# Patient Record
Sex: FEMALE | Race: WHITE | NOT HISPANIC OR LATINO
[De-identification: names, ages, dates, MRNs, and addresses within clinical notes are randomized per-mention and may not be internally consistent; named-entity substitution may affect disease eponyms.]

---

## 2017-01-04 ENCOUNTER — APPOINTMENT (OUTPATIENT)
Dept: INTERNAL MEDICINE | Facility: CLINIC | Age: 57
End: 2017-01-04

## 2017-11-13 ENCOUNTER — LABORATORY RESULT (OUTPATIENT)
Age: 57
End: 2017-11-13

## 2017-11-13 ENCOUNTER — TRANSCRIPTION ENCOUNTER (OUTPATIENT)
Age: 57
End: 2017-11-13

## 2017-11-14 ENCOUNTER — APPOINTMENT (OUTPATIENT)
Dept: INTERNAL MEDICINE | Facility: CLINIC | Age: 57
End: 2017-11-14
Payer: COMMERCIAL

## 2017-11-14 PROCEDURE — 36415 COLL VENOUS BLD VENIPUNCTURE: CPT

## 2017-11-15 ENCOUNTER — APPOINTMENT (OUTPATIENT)
Dept: INTERNAL MEDICINE | Facility: CLINIC | Age: 57
End: 2017-11-15
Payer: COMMERCIAL

## 2017-11-15 VITALS
BODY MASS INDEX: 29.02 KG/M2 | TEMPERATURE: 98.3 F | OXYGEN SATURATION: 98 % | HEIGHT: 64 IN | SYSTOLIC BLOOD PRESSURE: 134 MMHG | WEIGHT: 170 LBS | HEART RATE: 89 BPM | DIASTOLIC BLOOD PRESSURE: 74 MMHG

## 2017-11-15 DIAGNOSIS — Z87.2 PERSONAL HISTORY OF DISEASES OF THE SKIN AND SUBCUTANEOUS TISSUE: ICD-10-CM

## 2017-11-15 DIAGNOSIS — Z87.09 PERSONAL HISTORY OF OTHER DISEASES OF THE RESPIRATORY SYSTEM: ICD-10-CM

## 2017-11-15 DIAGNOSIS — M71.20 SYNOVIAL CYST OF POPLITEAL SPACE [BAKER], UNSPECIFIED KNEE: ICD-10-CM

## 2017-11-15 DIAGNOSIS — Z00.00 ENCOUNTER FOR GENERAL ADULT MEDICAL EXAMINATION W/OUT ABNORMAL FINDINGS: ICD-10-CM

## 2017-11-15 DIAGNOSIS — Z86.19 PERSONAL HISTORY OF OTHER INFECTIOUS AND PARASITIC DISEASES: ICD-10-CM

## 2017-11-15 DIAGNOSIS — R73.09 OTHER ABNORMAL GLUCOSE: ICD-10-CM

## 2017-11-15 DIAGNOSIS — R31.9 HEMATURIA, UNSPECIFIED: ICD-10-CM

## 2017-11-15 DIAGNOSIS — R73.02 IMPAIRED GLUCOSE TOLERANCE (ORAL): ICD-10-CM

## 2017-11-15 DIAGNOSIS — N95.1 MENOPAUSAL AND FEMALE CLIMACTERIC STATES: ICD-10-CM

## 2017-11-15 PROCEDURE — 99396 PREV VISIT EST AGE 40-64: CPT | Mod: 25

## 2017-11-15 PROCEDURE — 90471 IMMUNIZATION ADMIN: CPT

## 2017-11-15 PROCEDURE — 90715 TDAP VACCINE 7 YRS/> IM: CPT

## 2017-11-15 RX ORDER — NITROFURANTOIN MONOHYDRATE/MACROCRYSTALLINE 25; 75 MG/1; MG/1
100 CAPSULE ORAL
Refills: 0 | Status: ACTIVE | COMMUNITY

## 2017-11-15 RX ORDER — UBIDECARENONE/VIT E ACET 100MG-5
50 MCG CAPSULE ORAL
Refills: 0 | Status: ACTIVE | COMMUNITY

## 2017-11-16 ENCOUNTER — APPOINTMENT (OUTPATIENT)
Dept: CT IMAGING | Facility: CLINIC | Age: 57
End: 2017-11-16

## 2017-11-20 ENCOUNTER — TRANSCRIPTION ENCOUNTER (OUTPATIENT)
Age: 57
End: 2017-11-20

## 2017-11-20 RX ORDER — CLONAZEPAM 2 MG/1
2 TABLET ORAL
Refills: 0 | Status: DISCONTINUED | COMMUNITY
End: 2017-11-20

## 2017-11-20 RX ORDER — CLONAZEPAM 0.5 MG/1
0.5 TABLET ORAL DAILY
Refills: 0 | Status: ACTIVE | COMMUNITY
Start: 2017-11-20

## 2017-11-22 ENCOUNTER — APPOINTMENT (OUTPATIENT)
Dept: GASTROENTEROLOGY | Facility: CLINIC | Age: 57
End: 2017-11-22
Payer: COMMERCIAL

## 2017-11-22 DIAGNOSIS — R19.4 CHANGE IN BOWEL HABIT: ICD-10-CM

## 2017-11-22 DIAGNOSIS — R10.9 UNSPECIFIED ABDOMINAL PAIN: ICD-10-CM

## 2017-11-22 DIAGNOSIS — R19.7 DIARRHEA, UNSPECIFIED: ICD-10-CM

## 2017-11-22 PROCEDURE — 99243 OFF/OP CNSLTJ NEW/EST LOW 30: CPT

## 2017-11-22 RX ORDER — SODIUM SULFATE, POTASSIUM SULFATE, MAGNESIUM SULFATE 17.5; 3.13; 1.6 G/ML; G/ML; G/ML
17.5-3.13-1.6 SOLUTION, CONCENTRATE ORAL
Qty: 177 | Refills: 0 | Status: ACTIVE | COMMUNITY
Start: 2017-11-22 | End: 1900-01-01

## 2017-11-22 RX ORDER — HYOSCYAMINE SULFATE 0.12 MG/1
0.12 TABLET SUBLINGUAL 3 TIMES DAILY
Qty: 90 | Refills: 1 | Status: ACTIVE | COMMUNITY
Start: 2017-11-22 | End: 1900-01-01

## 2017-12-18 ENCOUNTER — APPOINTMENT (OUTPATIENT)
Dept: OBGYN | Facility: CLINIC | Age: 57
End: 2017-12-18
Payer: COMMERCIAL

## 2017-12-18 VITALS
HEART RATE: 87 BPM | BODY MASS INDEX: 28.85 KG/M2 | DIASTOLIC BLOOD PRESSURE: 80 MMHG | WEIGHT: 169 LBS | HEIGHT: 64 IN | SYSTOLIC BLOOD PRESSURE: 137 MMHG

## 2017-12-18 DIAGNOSIS — N95.9 UNSPECIFIED MENOPAUSAL AND PERIMENOPAUSAL DISORDER: ICD-10-CM

## 2017-12-18 PROCEDURE — 99386 PREV VISIT NEW AGE 40-64: CPT

## 2017-12-18 PROCEDURE — 99213 OFFICE O/P EST LOW 20 MIN: CPT | Mod: 25

## 2017-12-18 RX ORDER — VENLAFAXINE HYDROCHLORIDE 75 MG/1
75 CAPSULE, EXTENDED RELEASE ORAL
Qty: 60 | Refills: 0 | Status: ACTIVE | COMMUNITY
Start: 2017-07-28

## 2017-12-18 RX ORDER — NAPROXEN 500 MG/1
500 TABLET ORAL
Qty: 60 | Refills: 0 | Status: ACTIVE | COMMUNITY
Start: 2017-07-07

## 2017-12-18 RX ORDER — PREDNISONE 10 MG/1
10 TABLET ORAL
Qty: 30 | Refills: 0 | Status: ACTIVE | COMMUNITY
Start: 2017-06-19

## 2017-12-20 LAB — HPV HIGH+LOW RISK DNA PNL CVX: NOT DETECTED

## 2017-12-22 ENCOUNTER — APPOINTMENT (OUTPATIENT)
Dept: INTERNAL MEDICINE | Facility: CLINIC | Age: 57
End: 2017-12-22
Payer: COMMERCIAL

## 2017-12-22 VITALS
BODY MASS INDEX: 29.02 KG/M2 | OXYGEN SATURATION: 99 % | WEIGHT: 170 LBS | DIASTOLIC BLOOD PRESSURE: 86 MMHG | HEIGHT: 64 IN | HEART RATE: 87 BPM | TEMPERATURE: 96.9 F | SYSTOLIC BLOOD PRESSURE: 110 MMHG

## 2017-12-22 DIAGNOSIS — R19.7 DIARRHEA, UNSPECIFIED: ICD-10-CM

## 2017-12-22 DIAGNOSIS — R23.2 FLUSHING: ICD-10-CM

## 2017-12-22 DIAGNOSIS — K76.0 FATTY (CHANGE OF) LIVER, NOT ELSEWHERE CLASSIFIED: ICD-10-CM

## 2017-12-22 DIAGNOSIS — E66.3 OVERWEIGHT: ICD-10-CM

## 2017-12-22 DIAGNOSIS — E78.00 PURE HYPERCHOLESTEROLEMIA, UNSPECIFIED: ICD-10-CM

## 2017-12-22 PROCEDURE — 99213 OFFICE O/P EST LOW 20 MIN: CPT

## 2017-12-28 LAB — CYTOLOGY CVX/VAG DOC THIN PREP: NORMAL

## 2018-01-18 ENCOUNTER — APPOINTMENT (OUTPATIENT)
Dept: OBGYN | Facility: CLINIC | Age: 58
End: 2018-01-18
Payer: COMMERCIAL

## 2018-01-18 ENCOUNTER — ASOB RESULT (OUTPATIENT)
Age: 58
End: 2018-01-18

## 2018-01-18 VITALS
HEART RATE: 90 BPM | BODY MASS INDEX: 28.88 KG/M2 | HEIGHT: 64 IN | DIASTOLIC BLOOD PRESSURE: 79 MMHG | WEIGHT: 169.13 LBS | SYSTOLIC BLOOD PRESSURE: 118 MMHG

## 2018-01-18 DIAGNOSIS — N92.0 EXCESSIVE AND FREQUENT MENSTRUATION WITH REGULAR CYCLE: ICD-10-CM

## 2018-01-18 DIAGNOSIS — N85.8 OTHER SPECIFIED NONINFLAMMATORY DISORDERS OF UTERUS: ICD-10-CM

## 2018-01-18 PROCEDURE — 99213 OFFICE O/P EST LOW 20 MIN: CPT

## 2018-01-18 PROCEDURE — 76830 TRANSVAGINAL US NON-OB: CPT

## 2018-02-18 DIAGNOSIS — Z01.419 ENCOUNTER FOR GYNECOLOGICAL EXAMINATION (GENERAL) (ROUTINE) W/OUT ABNORMAL FINDINGS: ICD-10-CM

## 2018-02-18 DIAGNOSIS — Z87.440 PERSONAL HISTORY OF URINARY (TRACT) INFECTIONS: ICD-10-CM

## 2018-02-18 DIAGNOSIS — Z23 ENCOUNTER FOR IMMUNIZATION: ICD-10-CM

## 2018-02-18 DIAGNOSIS — Z12.31 ENCOUNTER FOR SCREENING MAMMOGRAM FOR MALIGNANT NEOPLASM OF BREAST: ICD-10-CM

## 2018-02-19 ENCOUNTER — APPOINTMENT (OUTPATIENT)
Dept: INTERNAL MEDICINE | Facility: CLINIC | Age: 58
End: 2018-02-19

## 2018-03-06 ENCOUNTER — APPOINTMENT (OUTPATIENT)
Dept: GASTROENTEROLOGY | Facility: CLINIC | Age: 58
End: 2018-03-06

## 2018-06-27 ENCOUNTER — TRANSCRIPTION ENCOUNTER (OUTPATIENT)
Age: 58
End: 2018-06-27

## 2018-07-02 ENCOUNTER — APPOINTMENT (OUTPATIENT)
Dept: INTERNAL MEDICINE | Facility: CLINIC | Age: 58
End: 2018-07-02
Payer: COMMERCIAL

## 2018-07-02 VITALS
WEIGHT: 169 LBS | SYSTOLIC BLOOD PRESSURE: 120 MMHG | DIASTOLIC BLOOD PRESSURE: 78 MMHG | BODY MASS INDEX: 28.85 KG/M2 | TEMPERATURE: 97 F | HEIGHT: 64 IN

## 2018-07-02 DIAGNOSIS — J06.9 ACUTE UPPER RESPIRATORY INFECTION, UNSPECIFIED: ICD-10-CM

## 2018-07-02 DIAGNOSIS — Z87.09 PERSONAL HISTORY OF OTHER DISEASES OF THE RESPIRATORY SYSTEM: ICD-10-CM

## 2018-07-02 DIAGNOSIS — R50.9 FEVER, UNSPECIFIED: ICD-10-CM

## 2018-07-02 PROCEDURE — 87880 STREP A ASSAY W/OPTIC: CPT | Mod: QW

## 2018-07-02 PROCEDURE — 99214 OFFICE O/P EST MOD 30 MIN: CPT | Mod: 25

## 2018-07-02 PROCEDURE — 36415 COLL VENOUS BLD VENIPUNCTURE: CPT

## 2018-07-02 RX ORDER — AMOXICILLIN AND CLAVULANATE POTASSIUM 875; 125 MG/1; MG/1
875-125 TABLET, COATED ORAL TWICE DAILY
Qty: 20 | Refills: 0 | Status: ACTIVE | COMMUNITY
Start: 2018-07-02 | End: 1900-01-01

## 2018-07-06 LAB
BACTERIA THROAT CULT: NORMAL
BASOPHILS # BLD AUTO: 0.03 K/UL
BASOPHILS NFR BLD AUTO: 0.4 %
EOSINOPHIL # BLD AUTO: 0.16 K/UL
EOSINOPHIL NFR BLD AUTO: 2.1 %
HCT VFR BLD CALC: 38.5 %
HETEROPH AB SER QL: NEGATIVE
HGB BLD-MCNC: 11.8 G/DL
IMM GRANULOCYTES NFR BLD AUTO: 0.4 %
LYMPHOCYTES # BLD AUTO: 1.94 K/UL
LYMPHOCYTES NFR BLD AUTO: 25.8 %
MAN DIFF?: NORMAL
MCHC RBC-ENTMCNC: 26.9 PG
MCHC RBC-ENTMCNC: 30.6 GM/DL
MCV RBC AUTO: 87.9 FL
MONOCYTES # BLD AUTO: 0.95 K/UL
MONOCYTES NFR BLD AUTO: 12.6 %
NEUTROPHILS # BLD AUTO: 4.42 K/UL
NEUTROPHILS NFR BLD AUTO: 58.7 %
PLATELET # BLD AUTO: 384 K/UL
RBC # BLD: 4.38 M/UL
RBC # FLD: 13.5 %
WBC # FLD AUTO: 7.53 K/UL

## 2018-12-14 DIAGNOSIS — H91.90 UNSPECIFIED HEARING LOSS, UNSPECIFIED EAR: ICD-10-CM

## 2018-12-26 ENCOUNTER — TRANSCRIPTION ENCOUNTER (OUTPATIENT)
Age: 58
End: 2018-12-26

## 2018-12-27 ENCOUNTER — EMERGENCY (EMERGENCY)
Facility: HOSPITAL | Age: 58
LOS: 1 days | Discharge: HOME | End: 2018-12-27
Attending: EMERGENCY MEDICINE
Payer: COMMERCIAL

## 2018-12-27 VITALS
DIASTOLIC BLOOD PRESSURE: 85 MMHG | OXYGEN SATURATION: 96 % | SYSTOLIC BLOOD PRESSURE: 131 MMHG | RESPIRATION RATE: 16 BRPM | HEART RATE: 84 BPM | TEMPERATURE: 98 F

## 2018-12-27 VITALS
HEIGHT: 64 IN | RESPIRATION RATE: 18 BRPM | TEMPERATURE: 98 F | HEART RATE: 101 BPM | DIASTOLIC BLOOD PRESSURE: 101 MMHG | WEIGHT: 160.06 LBS | OXYGEN SATURATION: 98 % | SYSTOLIC BLOOD PRESSURE: 152 MMHG

## 2018-12-27 PROCEDURE — 70450 CT HEAD/BRAIN W/O DYE: CPT

## 2018-12-27 PROCEDURE — 21310: CPT

## 2018-12-27 PROCEDURE — 90715 TDAP VACCINE 7 YRS/> IM: CPT

## 2018-12-27 PROCEDURE — 99284 EMERGENCY DEPT VISIT MOD MDM: CPT | Mod: 25

## 2018-12-27 PROCEDURE — 12052 INTMD RPR FACE/MM 2.6-5.0 CM: CPT | Mod: XU

## 2018-12-27 PROCEDURE — 70486 CT MAXILLOFACIAL W/O DYE: CPT

## 2018-12-27 PROCEDURE — 76377 3D RENDER W/INTRP POSTPROCES: CPT

## 2018-12-27 PROCEDURE — 90471 IMMUNIZATION ADMIN: CPT

## 2018-12-27 PROCEDURE — 70450 CT HEAD/BRAIN W/O DYE: CPT | Mod: 26

## 2018-12-27 PROCEDURE — 12052 INTMD RPR FACE/MM 2.6-5.0 CM: CPT | Mod: 54,59

## 2018-12-27 PROCEDURE — 70486 CT MAXILLOFACIAL W/O DYE: CPT | Mod: 26

## 2018-12-27 PROCEDURE — 96374 THER/PROPH/DIAG INJ IV PUSH: CPT | Mod: XU

## 2018-12-27 PROCEDURE — 76377 3D RENDER W/INTRP POSTPROCES: CPT | Mod: 26

## 2018-12-27 RX ORDER — TETANUS TOXOID, REDUCED DIPHTHERIA TOXOID AND ACELLULAR PERTUSSIS VACCINE, ADSORBED 5; 2.5; 8; 8; 2.5 [IU]/.5ML; [IU]/.5ML; UG/.5ML; UG/.5ML; UG/.5ML
0.5 SUSPENSION INTRAMUSCULAR ONCE
Qty: 0 | Refills: 0 | Status: COMPLETED | OUTPATIENT
Start: 2018-12-27 | End: 2018-12-27

## 2018-12-27 RX ORDER — ACETAMINOPHEN 500 MG
1000 TABLET ORAL ONCE
Qty: 0 | Refills: 0 | Status: COMPLETED | OUTPATIENT
Start: 2018-12-27 | End: 2018-12-27

## 2018-12-27 RX ORDER — LIDOCAINE/EPINEPHR/TETRACAINE 4-0.09-0.5
1 GEL WITH PREFILLED APPLICATOR (ML) TOPICAL ONCE
Qty: 0 | Refills: 0 | Status: COMPLETED | OUTPATIENT
Start: 2018-12-27 | End: 2018-12-27

## 2018-12-27 RX ADMIN — Medication 1 APPLICATION(S): at 17:20

## 2018-12-27 RX ADMIN — Medication 400 MILLIGRAM(S): at 17:18

## 2018-12-27 RX ADMIN — TETANUS TOXOID, REDUCED DIPHTHERIA TOXOID AND ACELLULAR PERTUSSIS VACCINE, ADSORBED 0.5 MILLILITER(S): 5; 2.5; 8; 8; 2.5 SUSPENSION INTRAMUSCULAR at 17:18

## 2018-12-27 RX ADMIN — Medication 1 TABLET(S): at 22:09

## 2018-12-27 RX ADMIN — Medication 1000 MILLIGRAM(S): at 22:09

## 2018-12-27 NOTE — ED PROVIDER NOTE - CARE PLAN
Principal Discharge DX:	Closed fracture of nasal bone, initial encounter  Assessment and plan of treatment:	1. follow up with your pmd in 24-48 hours  2. Follow up with ENT Dr. Pimentel [phone] in 7 days, call to make an appointment.  3. take augmentin 875 twice a day, take with food, take probiotic/yogurt daily. keep hydrated, tylenol 975 every 6 hours as needed for pain.   4. rest, refrain from reading/watching television, using telephone or anything straining your eyes for the next 3-4 days. Refrain from blowing your nose or sneezing through your nose, keep hydrated.  5. If you have any fevers, chills, confusion, numbness/tingling, nausea/vomiting, difficulty ambulating, worsening or concerning symptoms, return to the ED.  Secondary Diagnosis:	Concussion without loss of consciousness, initial encounter Principal Discharge DX:	Closed fracture of nasal bone, initial encounter  Assessment and plan of treatment:	1. follow up with your pmd in 24-48 hours  2. Follow up with ENT Dr. Pimentel [phone] in 7 days, call to make an appointment.  3. take augmentin 875 twice a day, take with food, take probiotic/yogurt daily. keep hydrated, tylenol 975 every 6 hours as needed for pain.   4. rest, refrain from reading/watching television, using telephone or anything straining your eyes for the next 3-4 days. Refrain from blowing your nose or sneezing through your nose, keep hydrated.  5. If you have any fevers, chills, confusion, numbness/tingling, nausea/vomiting, difficulty ambulating, worsening or concerning symptoms, return to the ED.  Secondary Diagnosis:	Concussion without loss of consciousness, initial encounter  Secondary Diagnosis:	Contusion of right knee, initial encounter

## 2018-12-27 NOTE — ED ADULT NURSE NOTE - NSIMPLEMENTINTERV_GEN_ALL_ED
Implemented All Universal Safety Interventions:  [place] to call system. Call bell, personal items and telephone within reach. Instruct patient to call for assistance. Room bathroom lighting operational. Non-slip footwear when patient is off stretcher. Physically safe environment: no spills, clutter or unnecessary equipment. Stretcher in lowest position, wheels locked, appropriate side rails in place.

## 2018-12-27 NOTE — ED PROVIDER NOTE - ATTENDING CONTRIBUTION TO CARE
------------ATTENDING NOTE------------   pt w/ family c/o mechanical fall, hit face on ground, no loss of consciousness but felt stunned, c/o swelling/deformity and constant mild ache to nose w/ laceration to L eyebrow and superficial abrasions to forehead / R knee, has normal neuro exam, no CTL spine tenderness and has +FROM w/o pain, no ocular injury or dental injury, wound repaired, reviewed imaging, ENT for fu, tolerating PO at dc, ambulatory w/o pain/discomfort, in depth dw all about ddx, tx, osorio, continued close outpt fu.  - Noel Garcia MD   -----------------------------------------------

## 2018-12-27 NOTE — ED PROVIDER NOTE - NSFOLLOWUPINSTRUCTIONS_ED_ALL_ED_FT
1. follow up with your pmd in 24-48 hours  2. Follow up with ENT Dr. Pimentel [phone] in 7 days, call to make an appointment.  3. take augmentin 875 twice a day, take with food, take probiotic/yogurt daily. keep hydrated, tylenol 975 every 6 hours as needed for pain.   4. rest, refrain from reading/watching television, using telephone or anything straining your eyes for the next 3-4 days. Refrain from blowing your nose or sneezing through your nose, keep hydrated. Keep sutures clean and dry- they will dissolve on their own.   5. If you have any fevers, chills, confusion, numbness/tingling, nausea/vomiting, difficulty ambulating, worsening or concerning symptoms, return to the ED.

## 2018-12-27 NOTE — CONSULT NOTE ADULT - ASSESSMENT
Assessment:  The patient is a 58 year old female with a past medical history significant for anxiety, who presents to the emergency room at [facility], [place] with a chief complaint of  facial pain s/p fall today at 4:00pm. Ddx nasal fracture, no e/o septal hematoma    Plan:  1) no acute surgical intervention  2) saline nasal spray BID x 3 weeks  3) ice packs to nose prn swelling, no to exceed 15 min at a time.

## 2018-12-27 NOTE — CONSULT NOTE ADULT - ENT GEN HX ROS MEA POS PC
nasal congestion/post-nasal discharge/epistaxis/sinus symptoms/nasal discharge/nasal obstruction/dysphagia

## 2018-12-27 NOTE — CONSULT NOTE ADULT - COMMENTS
Vital Signs Last 24 Hrs  T(C): 36.6 (27 Dec 2018 21:17), Max: 36.7 (27 Dec 2018 16:48)  T(F): 97.9 (27 Dec 2018 21:17), Max: 98 (27 Dec 2018 16:48)  HR: 84 (27 Dec 2018 21:17) (84 - 107)  BP: 131/85 (27 Dec 2018 21:17) (131/85 - 152/101)  BP(mean): --  RR: 16 (27 Dec 2018 21:17) (16 - 19)  SpO2: 96% (27 Dec 2018 21:17) (96% - 98%)

## 2018-12-27 NOTE — ED PROCEDURE NOTE - PROCEDURE ADDITIONAL DETAILS
4 cm superficial laceration L eyebrow, no depression/crepitus, no ocular/lacrimal injury, nvi w/ bcr distally around in V1/V2   - anesthesia LET, copious irrigation w/ NS, explored to base in bloodless field   - Vicryl 3-0 X3 subcutaneous sutures   - FG 6-0 X9 simple interrupted sutures with good approximation   - steri strip/adhesive dressing   - in depth dw pt/family about wound care, scar formation    Noel Garcia MD

## 2018-12-27 NOTE — ED ADULT NURSE NOTE - CAS ELECT INFOMATION PROVIDED
DC instructions/f/u with ENT Dr. Pimentel, augmentin at pharmacy, tylenol, rest, decrease screen time

## 2018-12-27 NOTE — ED PROVIDER NOTE - SECONDARY DIAGNOSIS.
Concussion without loss of consciousness, initial encounter Contusion of right knee, initial encounter

## 2018-12-27 NOTE — ED PROVIDER NOTE - PLAN OF CARE
1. follow up with your pmd in 24-48 hours  2. Follow up with ENT Dr. Pimentel [phone] in 7 days, call to make an appointment.  3. take augmentin 875 twice a day, take with food, take probiotic/yogurt daily. keep hydrated, tylenol 975 every 6 hours as needed for pain.   4. rest, refrain from reading/watching television, using telephone or anything straining your eyes for the next 3-4 days. Refrain from blowing your nose or sneezing through your nose, keep hydrated.  5. If you have any fevers, chills, confusion, numbness/tingling, nausea/vomiting, difficulty ambulating, worsening or concerning symptoms, return to the ED.

## 2018-12-27 NOTE — ED PROVIDER NOTE - OBJECTIVE STATEMENT
59 yo female with pmh of anxiety, presenting for right knee and facial pain s/p fall today at 4:00pm. She notes she was walking on a metal bridge, when she tripped over her right foot and landed on her right knee and her face sustaining abrasions to both areas.  She notes she did not lose consciousness, no headache, n/v afterwards, no chest pain, heart palpitations before or after. She recalls the entire event, no AC use. She was able to ambulate afterwards with minor assistance. She has no change in vision, no blurred vision.

## 2018-12-27 NOTE — ED ADULT NURSE NOTE - OBJECTIVE STATEMENT
58 y.o. Female presents to the ED via EMS for trip and fall. Pt reports tripping on L foot and fell on R knee and face on train platform. Denies blood thinners, vision changes. L medial eyebrow lac noted - actively bleeding. Deformity to nose noted. R knee abrasion. As per EMS, pt is ambulatory at scene with assistance. Unknown last tetanus. Conversing appropriately. A&Ox3. Denies LOC. Pt is in no current distress. Comfort and safety provided. Will continue to monitor. Awaiting ED MD consult.

## 2018-12-27 NOTE — ED PROVIDER NOTE - ENMT, MLM
+ left periorbital ecchymosis and tenderness, Airway patent, Nasal mucosa clear. Mouth with normal mucosa. Throat has no vesicles, no oropharyngeal exudates and uvula is midline. + left periorbital ecchymosis and tenderness, Nasal bone tenderness,  Airway patent, Nasal mucosa clear. Mouth with normal mucosa. Throat has no vesicles, no oropharyngeal exudates and uvula is midline.

## 2018-12-27 NOTE — CONSULT NOTE ADULT - ENMT COMMENTS
Flexible Fiberoptic Laryngoscopy: clear nasopharynx to glottis, tvc mobile b/l, no e/o septal hematoma, airway patent

## 2018-12-27 NOTE — CONSULT NOTE ADULT - NOSE
no deviation/dried blood/inflamed mucosa/congestion/edema over nasal bridge which is straight. Nasal/sinus endoscopy: maxillary sinus accessed via inferior meatus with serosanguinous fluid b/l, no masses

## 2018-12-27 NOTE — CONSULT NOTE ADULT - SUBJECTIVE AND OBJECTIVE BOX
HPI: The patient is a 58 year old female with a past medical history significant for anxiety, who presents to the emergency room at [facility] with a chief complaint of  facial pain s/p fall today at 4:00pm. She notes she was walking on a metal bridge, when she tripped over her right foot and landed on her right knee and her face sustaining abrasions to both areas.  She notes she did not lose consciousness, no headache, n/v afterwards, no chest pain, heart palpitations before or after. She recalls the entire event, no AC use. She was able to ambulate afterwards with minor assistance. She has no change in vision, no blurred vision. She has been coughing up occasional blood in saliva, now stopped	    HIV:    HIV Status:  · Offered: Unable to consent due to medical condition	    PAST MEDICAL/SURGICAL/FAMILY/SOCIAL HISTORY:    Tobacco Usage:  · Tobacco Usage	Unknown if ever smoked	    ALLERGIES AND HOME MEDICATIONS:   Allergies:        Allergies:  	Keflex: Drug, Unknown  	Xanax: Drug, Unknown  	"Blue dye": Miscellaneous, Unknown, "Blue dye"    Home Medications:   * Outpatient Medication Status not yet specified

## 2018-12-28 ENCOUNTER — EMERGENCY (EMERGENCY)
Facility: HOSPITAL | Age: 58
LOS: 1 days | Discharge: HOME | End: 2018-12-28
Attending: EMERGENCY MEDICINE
Payer: COMMERCIAL

## 2018-12-28 VITALS
SYSTOLIC BLOOD PRESSURE: 146 MMHG | RESPIRATION RATE: 16 BRPM | HEART RATE: 84 BPM | WEIGHT: 167.99 LBS | DIASTOLIC BLOOD PRESSURE: 78 MMHG | HEIGHT: 64 IN | OXYGEN SATURATION: 97 %

## 2018-12-28 VITALS
DIASTOLIC BLOOD PRESSURE: 81 MMHG | TEMPERATURE: 98 F | HEART RATE: 73 BPM | SYSTOLIC BLOOD PRESSURE: 137 MMHG | OXYGEN SATURATION: 97 % | RESPIRATION RATE: 18 BRPM

## 2018-12-28 PROCEDURE — 99284 EMERGENCY DEPT VISIT MOD MDM: CPT | Mod: 25

## 2018-12-28 PROCEDURE — 96375 TX/PRO/DX INJ NEW DRUG ADDON: CPT

## 2018-12-28 PROCEDURE — 70450 CT HEAD/BRAIN W/O DYE: CPT | Mod: 26

## 2018-12-28 PROCEDURE — 96374 THER/PROPH/DIAG INJ IV PUSH: CPT

## 2018-12-28 PROCEDURE — 70450 CT HEAD/BRAIN W/O DYE: CPT

## 2018-12-28 RX ORDER — METOCLOPRAMIDE HCL 10 MG
10 TABLET ORAL ONCE
Qty: 0 | Refills: 0 | Status: COMPLETED | OUTPATIENT
Start: 2018-12-28 | End: 2018-12-28

## 2018-12-28 RX ORDER — ACETAMINOPHEN 500 MG
1000 TABLET ORAL ONCE
Qty: 0 | Refills: 0 | Status: COMPLETED | OUTPATIENT
Start: 2018-12-28 | End: 2018-12-28

## 2018-12-28 RX ORDER — KETOROLAC TROMETHAMINE 30 MG/ML
15 SYRINGE (ML) INJECTION ONCE
Qty: 0 | Refills: 0 | Status: DISCONTINUED | OUTPATIENT
Start: 2018-12-28 | End: 2018-12-28

## 2018-12-28 RX ORDER — SODIUM CHLORIDE 9 MG/ML
1000 INJECTION INTRAMUSCULAR; INTRAVENOUS; SUBCUTANEOUS ONCE
Qty: 0 | Refills: 0 | Status: COMPLETED | OUTPATIENT
Start: 2018-12-28 | End: 2018-12-28

## 2018-12-28 RX ORDER — ONDANSETRON 8 MG/1
1 TABLET, FILM COATED ORAL
Qty: 20 | Refills: 0 | OUTPATIENT
Start: 2018-12-28

## 2018-12-28 RX ADMIN — SODIUM CHLORIDE 1000 MILLILITER(S): 9 INJECTION INTRAMUSCULAR; INTRAVENOUS; SUBCUTANEOUS at 04:13

## 2018-12-28 RX ADMIN — Medication 1000 MILLIGRAM(S): at 06:10

## 2018-12-28 RX ADMIN — Medication 10 MILLIGRAM(S): at 04:13

## 2018-12-28 RX ADMIN — Medication 15 MILLIGRAM(S): at 07:15

## 2018-12-28 RX ADMIN — Medication 400 MILLIGRAM(S): at 04:13

## 2018-12-28 NOTE — ED ADULT NURSE REASSESSMENT NOTE - NS ED NURSE REASSESS COMMENT FT1
Patient complaining that EMS placed IV is causing pain. 20G left hand IV removed. 20G peripheral IV placed in right ac. No complaints at this time

## 2018-12-28 NOTE — ED PROVIDER NOTE - OBJECTIVE STATEMENT
58yof tripped and fell yesterday evening landing on her face, sustained bilateral nasal bone fractures and concussion. Since discharge from the ED she has been having increasing frontal headache, with persistent nausea and vomiting. Has been unable to keep down liquids or medications.

## 2018-12-28 NOTE — ED PROVIDER NOTE - MEDICAL DECISION MAKING DETAILS
Worsening headache and nausea after closed head trauma, likely post concussive syndrome however also concerned for delayed bleed. Will treat symptoms w/ IV pain meds and antiemetics, repeat CTH.

## 2018-12-28 NOTE — ED ADULT NURSE NOTE - OBJECTIVE STATEMENT
62y/o Female parented to the ED from home via EMS with complaint of headache and vomiting. A&Ox3, ambulatory. Patient was seen in ED earlier today after [place]. Patient was dx with nasal fx and concussion and was d/c home. Patient woke up with severe frontal headache and multiple episodes of vomiting. Patient reports 7 episodes of vomiting,. denies blood in vomit. Patient rates pain 8/10 at this time. Bilateral periorbital ecchymosis noted. Ecchymosis noted over bridge of nose. Patient denies chest pain, fever, chills, SOB.  20G peripheral IV placed by EMS. EMS administered 4mg Zofran IVP, and 100mL of IV fluids. Patient states that Zofran helped the nausea.

## 2018-12-28 NOTE — ED PROVIDER NOTE - ATTENDING CONTRIBUTION TO CARE
attending Obey: 58yF seen earlier today for facial trauma sustained after mechanical trip and fall p/w persistent frontal headache and nausea/vomiting not tolerating PO. Prescribed pain meds but nothing for nausea. Exam with bilateral raccoon eyes, PERRL, full strength throughout, facial abrasions and closure with steristrips over lower forehead. Worsening headache and nausea after closed head trauma, likely post concussive syndrome however will obtain CTH eval for delayed bleed, treat symptomatically and reassess

## 2019-07-16 ENCOUNTER — TRANSCRIPTION ENCOUNTER (OUTPATIENT)
Age: 59
End: 2019-07-16

## 2019-07-18 NOTE — ED PROVIDER NOTE - CROS ED SKIN ALL NEG
Discharge Note: Patient to discharge home with foely.  care reviewed with patient. Patient verbalized understanding of follow up appointment to have  removed and voiding trial in office. Rx to be picked up in pharmacy upon discharge. Patient denies pain. No further questions voiced.   - - -

## 2020-02-07 ENCOUNTER — TRANSCRIPTION ENCOUNTER (OUTPATIENT)
Age: 60
End: 2020-02-07

## 2020-12-16 PROBLEM — Z87.09 HISTORY OF SORE THROAT: Status: RESOLVED | Noted: 2018-07-02 | Resolved: 2020-12-16

## 2020-12-16 PROBLEM — J06.9 ACUTE URI: Status: RESOLVED | Noted: 2018-07-02 | Resolved: 2020-12-16

## 2022-03-02 ENCOUNTER — TRANSCRIPTION ENCOUNTER (OUTPATIENT)
Age: 62
End: 2022-03-02

## 2022-05-03 ENCOUNTER — NON-APPOINTMENT (OUTPATIENT)
Age: 62
End: 2022-05-03

## 2022-06-28 ENCOUNTER — APPOINTMENT (OUTPATIENT)
Dept: OBGYN | Facility: CLINIC | Age: 62
End: 2022-06-28
Payer: COMMERCIAL

## 2022-06-28 VITALS
HEIGHT: 55 IN | WEIGHT: 154 LBS | DIASTOLIC BLOOD PRESSURE: 68 MMHG | BODY MASS INDEX: 35.64 KG/M2 | SYSTOLIC BLOOD PRESSURE: 104 MMHG

## 2022-06-28 PROCEDURE — 99386 PREV VISIT NEW AGE 40-64: CPT

## 2022-06-28 PROCEDURE — 82270 OCCULT BLOOD FECES: CPT

## 2022-06-29 LAB — HPV HIGH+LOW RISK DNA PNL CVX: NOT DETECTED

## 2022-07-04 LAB — CYTOLOGY CVX/VAG DOC THIN PREP: ABNORMAL

## 2023-01-18 NOTE — ED PROCEDURE NOTE - NS_EDPROVIDERDISPOUSERTYPE_ED_A_ED
Attending Attestation (For Attendings USE Only)... Asc Procedure Text (A): After obtaining clear surgical margins the patient was sent to an ASC for surgical repair.  The patient understands they will receive post-surgical care and follow-up from the ASC physician.

## 2023-04-30 ENCOUNTER — NON-APPOINTMENT (OUTPATIENT)
Age: 63
End: 2023-04-30

## 2023-05-12 ENCOUNTER — NON-APPOINTMENT (OUTPATIENT)
Age: 63
End: 2023-05-12

## 2023-10-31 ENCOUNTER — APPOINTMENT (OUTPATIENT)
Dept: OBGYN | Facility: CLINIC | Age: 63
End: 2023-10-31
Payer: COMMERCIAL

## 2023-10-31 VITALS
WEIGHT: 172 LBS | SYSTOLIC BLOOD PRESSURE: 134 MMHG | HEIGHT: 64 IN | BODY MASS INDEX: 29.37 KG/M2 | DIASTOLIC BLOOD PRESSURE: 82 MMHG

## 2023-10-31 DIAGNOSIS — N90.5 ATROPHY OF VULVA: ICD-10-CM

## 2023-10-31 DIAGNOSIS — Z01.411 ENCOUNTER FOR GYNECOLOGICAL EXAMINATION (GENERAL) (ROUTINE) WITH ABNORMAL FINDINGS: ICD-10-CM

## 2023-10-31 PROCEDURE — 82270 OCCULT BLOOD FECES: CPT

## 2023-10-31 PROCEDURE — 99396 PREV VISIT EST AGE 40-64: CPT

## 2023-11-01 ENCOUNTER — TRANSCRIPTION ENCOUNTER (OUTPATIENT)
Age: 63
End: 2023-11-01

## 2023-11-01 DIAGNOSIS — R92.30 INCONCLUSIVE MAMMOGRAM: ICD-10-CM

## 2023-11-01 DIAGNOSIS — Z12.39 ENCOUNTER FOR OTHER SCREENING FOR MALIGNANT NEOPLASM OF BREAST: ICD-10-CM

## 2023-11-01 DIAGNOSIS — R92.2 INCONCLUSIVE MAMMOGRAM: ICD-10-CM

## 2023-11-02 LAB — HPV HIGH+LOW RISK DNA PNL CVX: NOT DETECTED

## 2023-11-06 LAB — CYTOLOGY CVX/VAG DOC THIN PREP: ABNORMAL

## 2023-12-24 ENCOUNTER — NON-APPOINTMENT (OUTPATIENT)
Age: 63
End: 2023-12-24

## 2024-03-02 NOTE — ED ADULT NURSE NOTE - NS ED NOTE ABUSE RESPONSE YN
PAST SURGICAL HISTORY:  History of salpingectomy left 2009    Status post laparoscopy 1996- endometriosis     Yes

## 2025-04-30 ENCOUNTER — APPOINTMENT (OUTPATIENT)
Dept: OBGYN | Facility: CLINIC | Age: 65
End: 2025-04-30
Payer: COMMERCIAL

## 2025-04-30 ENCOUNTER — NON-APPOINTMENT (OUTPATIENT)
Age: 65
End: 2025-04-30

## 2025-04-30 VITALS
DIASTOLIC BLOOD PRESSURE: 87 MMHG | SYSTOLIC BLOOD PRESSURE: 138 MMHG | WEIGHT: 173 LBS | BODY MASS INDEX: 29.53 KG/M2 | HEIGHT: 64 IN

## 2025-04-30 DIAGNOSIS — N81.6 RECTOCELE: ICD-10-CM

## 2025-04-30 DIAGNOSIS — Z13.31 ENCOUNTER FOR SCREENING FOR DEPRESSION: ICD-10-CM

## 2025-04-30 DIAGNOSIS — R19.5 OTHER FECAL ABNORMALITIES: ICD-10-CM

## 2025-04-30 DIAGNOSIS — N90.5 ATROPHY OF VULVA: ICD-10-CM

## 2025-04-30 DIAGNOSIS — Z01.411 ENCOUNTER FOR GYNECOLOGICAL EXAMINATION (GENERAL) (ROUTINE) WITH ABNORMAL FINDINGS: ICD-10-CM

## 2025-04-30 DIAGNOSIS — N81.11 CYSTOCELE, MIDLINE: ICD-10-CM

## 2025-04-30 DIAGNOSIS — R92.30 DENSE BREASTS, UNSPECIFIED: ICD-10-CM

## 2025-04-30 PROCEDURE — 99459 PELVIC EXAMINATION: CPT

## 2025-04-30 PROCEDURE — 82270 OCCULT BLOOD FECES: CPT

## 2025-04-30 PROCEDURE — G0444 DEPRESSION SCREEN ANNUAL: CPT | Mod: 59

## 2025-04-30 PROCEDURE — 99396 PREV VISIT EST AGE 40-64: CPT

## 2025-04-30 PROCEDURE — 77080 DXA BONE DENSITY AXIAL: CPT

## 2025-05-02 LAB — HPV HIGH+LOW RISK DNA PNL CVX: NOT DETECTED

## 2025-05-05 LAB — CYTOLOGY CVX/VAG DOC THIN PREP: ABNORMAL
